# Patient Record
Sex: FEMALE | Race: OTHER | HISPANIC OR LATINO | Employment: FULL TIME | URBAN - METROPOLITAN AREA
[De-identification: names, ages, dates, MRNs, and addresses within clinical notes are randomized per-mention and may not be internally consistent; named-entity substitution may affect disease eponyms.]

---

## 2023-11-18 ENCOUNTER — OFFICE VISIT (OUTPATIENT)
Dept: PRIMARY CARE | Facility: CLINIC | Age: 38
End: 2023-11-18
Payer: COMMERCIAL

## 2023-11-18 VITALS
BODY MASS INDEX: 36.52 KG/M2 | WEIGHT: 186 LBS | HEIGHT: 60 IN | DIASTOLIC BLOOD PRESSURE: 62 MMHG | SYSTOLIC BLOOD PRESSURE: 112 MMHG

## 2023-11-18 DIAGNOSIS — Z00.00 HEALTH CARE MAINTENANCE: Primary | ICD-10-CM

## 2023-11-18 PROBLEM — R19.8 TENESMUS: Status: ACTIVE | Noted: 2017-10-30

## 2023-11-18 PROBLEM — M54.2 NECK PAIN: Status: ACTIVE | Noted: 2018-09-19

## 2023-11-18 PROBLEM — R22.32 SUBCUTANEOUS MASS OF FINGER OF LEFT HAND: Status: ACTIVE | Noted: 2023-11-18

## 2023-11-18 PROBLEM — M12.9 ARTHROPATHY: Status: ACTIVE | Noted: 2018-09-19

## 2023-11-18 PROBLEM — O34.10 UTERINE FIBROID DURING PREGNANCY, ANTEPARTUM (HHS-HCC): Status: ACTIVE | Noted: 2018-11-11

## 2023-11-18 PROBLEM — R11.0 NAUSEA: Status: ACTIVE | Noted: 2018-09-19

## 2023-11-18 PROBLEM — K64.4 EXTERNAL HEMORRHOIDS: Status: ACTIVE | Noted: 2017-10-30

## 2023-11-18 PROBLEM — R10.9 ABDOMINAL PAIN: Status: ACTIVE | Noted: 2018-09-19

## 2023-11-18 PROBLEM — K60.2 ANAL FISSURE: Status: ACTIVE | Noted: 2018-09-19

## 2023-11-18 PROBLEM — M25.511 RIGHT SHOULDER PAIN: Status: ACTIVE | Noted: 2023-11-18

## 2023-11-18 PROBLEM — S13.9XXA NECK SPRAIN: Status: ACTIVE | Noted: 2018-09-19

## 2023-11-18 PROBLEM — M25.842 CYST OF JOINT OF LEFT HAND: Status: ACTIVE | Noted: 2023-11-18

## 2023-11-18 PROBLEM — G43.909 MIGRAINE HEADACHE: Status: ACTIVE | Noted: 2023-11-18

## 2023-11-18 PROBLEM — S23.9XXA THORACIC BACK SPRAIN: Status: ACTIVE | Noted: 2018-09-19

## 2023-11-18 PROBLEM — K62.6 ULCER OF ANUS: Status: ACTIVE | Noted: 2018-09-19

## 2023-11-18 PROBLEM — M47.814 THORACIC SPONDYLOSIS WITHOUT MYELOPATHY: Status: ACTIVE | Noted: 2018-09-19
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
## 2023-11-18 PROBLEM — D25.9 UTERINE FIBROID DURING PREGNANCY, ANTEPARTUM (HHS-HCC): Status: ACTIVE | Noted: 2018-11-11

## 2023-11-18 PROCEDURE — 1036F TOBACCO NON-USER: CPT | Performed by: INTERNAL MEDICINE

## 2023-11-18 PROCEDURE — 99385 PREV VISIT NEW AGE 18-39: CPT | Performed by: INTERNAL MEDICINE

## 2023-11-18 ASSESSMENT — ENCOUNTER SYMPTOMS
LOSS OF SENSATION IN FEET: 0
OCCASIONAL FEELINGS OF UNSTEADINESS: 0
DEPRESSION: 0

## 2023-11-18 NOTE — PROGRESS NOTES
Subjective   Patient ID: Jodi Romero is a 38 y.o. female who presents for New Patient Visit.    HPI   To establish PCP  For physical    Past medical history: Tubal ligation and   Social history: Non-smoker, social drinker  Family history: Father with hypertension end-stage renal disease  Occupation: Works as vascular tech  Review of Systems    Objective   /62   Ht 1.524 m (5')   Wt 84.4 kg (186 lb)   BMI 36.33 kg/m²     Physical Exam  Vitals reviewed.   Constitutional:       Appearance: Normal appearance.   HENT:      Head: Normocephalic and atraumatic.      Right Ear: Tympanic membrane, ear canal and external ear normal.      Left Ear: Tympanic membrane, ear canal and external ear normal.      Nose: Nose normal.      Mouth/Throat:      Pharynx: Oropharynx is clear.   Eyes:      Extraocular Movements: Extraocular movements intact.      Conjunctiva/sclera: Conjunctivae normal.      Pupils: Pupils are equal, round, and reactive to light.   Cardiovascular:      Rate and Rhythm: Normal rate and regular rhythm.      Pulses: Normal pulses.      Heart sounds: Normal heart sounds.   Pulmonary:      Effort: Pulmonary effort is normal.      Breath sounds: Normal breath sounds.   Abdominal:      General: Abdomen is flat. Bowel sounds are normal.      Palpations: Abdomen is soft.   Musculoskeletal:      Cervical back: Normal range of motion and neck supple.   Skin:     General: Skin is warm and dry.   Neurological:      General: No focal deficit present.      Mental Status: She is alert and oriented to person, place, and time.   Psychiatric:         Mood and Affect: Mood normal.         Assessment/Plan   Problem List Items Addressed This Visit    None  Visit Diagnoses         Codes    Health care maintenance    -  Primary Z00.00    Relevant Orders    CBC (Completed)    Comprehensive Metabolic Panel (Completed)    Lipid Panel (Completed)    Thyroid Stimulating Hormone (Completed)    Vitamin B12 (Completed)     Vitamin D 25-Hydroxy,Total (for eval of Vitamin D levels) (Completed)          Physical normal  Routine blood work ordered  Discussed diet and exercise

## 2023-11-21 ENCOUNTER — LAB (OUTPATIENT)
Dept: LAB | Facility: LAB | Age: 38
End: 2023-11-21
Payer: COMMERCIAL

## 2023-11-21 DIAGNOSIS — Z00.00 HEALTH CARE MAINTENANCE: ICD-10-CM

## 2023-11-21 LAB
25(OH)D3 SERPL-MCNC: 15 NG/ML (ref 30–100)
ALBUMIN SERPL BCP-MCNC: 4.5 G/DL (ref 3.4–5)
ALP SERPL-CCNC: 64 U/L (ref 33–110)
ALT SERPL W P-5'-P-CCNC: 14 U/L (ref 7–45)
ANION GAP SERPL CALC-SCNC: 13 MMOL/L (ref 10–20)
AST SERPL W P-5'-P-CCNC: 13 U/L (ref 9–39)
BILIRUB SERPL-MCNC: 0.6 MG/DL (ref 0–1.2)
BUN SERPL-MCNC: 16 MG/DL (ref 6–23)
CALCIUM SERPL-MCNC: 9.7 MG/DL (ref 8.6–10.6)
CHLORIDE SERPL-SCNC: 103 MMOL/L (ref 98–107)
CHOLEST SERPL-MCNC: 209 MG/DL (ref 0–199)
CHOLESTEROL/HDL RATIO: 5
CO2 SERPL-SCNC: 29 MMOL/L (ref 21–32)
CREAT SERPL-MCNC: 0.68 MG/DL (ref 0.5–1.05)
ERYTHROCYTE [DISTWIDTH] IN BLOOD BY AUTOMATED COUNT: 12.1 % (ref 11.5–14.5)
GFR SERPL CREATININE-BSD FRML MDRD: >90 ML/MIN/1.73M*2
GLUCOSE SERPL-MCNC: 92 MG/DL (ref 74–99)
HCT VFR BLD AUTO: 44.8 % (ref 36–46)
HDLC SERPL-MCNC: 41.5 MG/DL
HGB BLD-MCNC: 14.3 G/DL (ref 12–16)
LDLC SERPL CALC-MCNC: 111 MG/DL
MCH RBC QN AUTO: 28.4 PG (ref 26–34)
MCHC RBC AUTO-ENTMCNC: 31.9 G/DL (ref 32–36)
MCV RBC AUTO: 89 FL (ref 80–100)
NON HDL CHOLESTEROL: 168 MG/DL (ref 0–149)
NRBC BLD-RTO: 0 /100 WBCS (ref 0–0)
PLATELET # BLD AUTO: 361 X10*3/UL (ref 150–450)
POTASSIUM SERPL-SCNC: 4.6 MMOL/L (ref 3.5–5.3)
PROT SERPL-MCNC: 7 G/DL (ref 6.4–8.2)
RBC # BLD AUTO: 5.03 X10*6/UL (ref 4–5.2)
SODIUM SERPL-SCNC: 140 MMOL/L (ref 136–145)
TRIGL SERPL-MCNC: 285 MG/DL (ref 0–149)
TSH SERPL-ACNC: 1.61 MIU/L (ref 0.44–3.98)
VIT B12 SERPL-MCNC: 336 PG/ML (ref 211–911)
VLDL: 57 MG/DL (ref 0–40)
WBC # BLD AUTO: 8.9 X10*3/UL (ref 4.4–11.3)

## 2023-11-21 PROCEDURE — 80053 COMPREHEN METABOLIC PANEL: CPT

## 2023-11-21 PROCEDURE — 36415 COLL VENOUS BLD VENIPUNCTURE: CPT

## 2023-11-21 PROCEDURE — 82607 VITAMIN B-12: CPT

## 2023-11-21 PROCEDURE — 82306 VITAMIN D 25 HYDROXY: CPT

## 2023-11-21 PROCEDURE — 85027 COMPLETE CBC AUTOMATED: CPT

## 2023-11-21 PROCEDURE — 84443 ASSAY THYROID STIM HORMONE: CPT

## 2023-11-21 PROCEDURE — 80061 LIPID PANEL: CPT

## 2024-04-02 ENCOUNTER — OFFICE VISIT (OUTPATIENT)
Dept: OBSTETRICS AND GYNECOLOGY | Facility: CLINIC | Age: 39
End: 2024-04-02
Payer: COMMERCIAL

## 2024-04-02 VITALS
HEIGHT: 60 IN | DIASTOLIC BLOOD PRESSURE: 76 MMHG | SYSTOLIC BLOOD PRESSURE: 120 MMHG | WEIGHT: 197 LBS | BODY MASS INDEX: 38.68 KG/M2

## 2024-04-02 DIAGNOSIS — Z01.419 WELL WOMAN EXAM: Primary | ICD-10-CM

## 2024-04-02 DIAGNOSIS — N94.19 DYSPAREUNIA DUE TO MEDICAL CONDITION IN FEMALE: ICD-10-CM

## 2024-04-02 DIAGNOSIS — N94.6 DYSMENORRHEA: ICD-10-CM

## 2024-04-02 DIAGNOSIS — R10.2 PAIN IN FEMALE PELVIS: ICD-10-CM

## 2024-04-02 PROCEDURE — 99395 PREV VISIT EST AGE 18-39: CPT | Performed by: OBSTETRICS & GYNECOLOGY

## 2024-04-02 NOTE — PROGRESS NOTES
Subjective   Patient ID: Jodi Romero is a 38 y.o. female who presents for Well woman visit and Pelvic Pain.  Review of last year's note,     Alin Liang MD  Physician  Specialty: Obstetrics and Gynecology     Progress Notes     Signed     Encounter Date: 2021    Signed           Patient Discussion/Summary     Established patient 36 years old. 2 C-sections. Tubal ligation. Comes in for annual exam. She is aware of a lesion on the right labia majora at the 10 o'clock position     No new worries or concerns. Breast and pelvic exams were normal Pap smear obtained     We do see a small area of erythema. She had some recent drainage. She has had these symptoms in the past where it appears to be spontaneously draining vulvar abscess     There is a small area of ulceration which could just be the leading point of drainage but we want to rule out HSV so an HSV culture was done     Impressions well woman exam 2 children. Bilateral salpingectomy at time of last , culture to rule out HSV           Presents today for annual exam but having worsening symptoms of pain and cramps premenstrually and then after sex having some bleeding and pain with sex    2 C-sections.  Tubal ligation.  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  No meds.  Non-smoker no prior surgeries other than to  tubal ligation.  Children are 3 and 4 years old.  Last Pap  was negative negative    Family history thinks her mother had fibroids    Exam today breast abdominal pelvic exams normal.  Speculum exam I did not see any abnormal lesions.  Talked with possible causes such as endometriosis, adenomyosis, fibroids.    Well woman exam.  Increasing pelvic pain and dyspareunia.  Obtain pelvic ultrasound.  Follow-up to review results and recommendations.  Next Pap would be     Pelvic Pain  The patient's primary symptoms include pelvic pain.       Review of Systems   Genitourinary:  Positive for  dyspareunia and pelvic pain.       Objective   Physical Exam  Constitutional:       Appearance: Normal appearance. She is obese.   Chest:   Breasts:     Right: Normal.      Left: Normal.   Genitourinary:     General: Normal vulva.      Vagina: Normal.      Cervix: Normal.      Uterus: Normal.       Adnexa: Right adnexa normal and left adnexa normal.   Neurological:      Mental Status: She is alert.         Assessment/Plan   Well woman exam.  Increasing premenstrual pelvic pain.  Dyspareunia.  Bleeding after sex.  Obtain pelvic ultrasound and follow-up to review results and recommendations .  Has tried OTC meds for her pain         Alin Liang MD 04/02/24 11:25 AM

## 2024-04-16 ENCOUNTER — HOSPITAL ENCOUNTER (OUTPATIENT)
Dept: RADIOLOGY | Facility: HOSPITAL | Age: 39
Discharge: HOME | End: 2024-04-16
Payer: COMMERCIAL

## 2024-04-16 DIAGNOSIS — N94.19 DYSPAREUNIA DUE TO MEDICAL CONDITION IN FEMALE: ICD-10-CM

## 2024-04-16 DIAGNOSIS — N94.6 DYSMENORRHEA: ICD-10-CM

## 2024-04-16 DIAGNOSIS — R10.2 PAIN IN FEMALE PELVIS: ICD-10-CM

## 2024-04-16 PROCEDURE — 76830 TRANSVAGINAL US NON-OB: CPT | Performed by: STUDENT IN AN ORGANIZED HEALTH CARE EDUCATION/TRAINING PROGRAM

## 2024-04-16 PROCEDURE — 76856 US EXAM PELVIC COMPLETE: CPT

## 2024-04-16 PROCEDURE — 76856 US EXAM PELVIC COMPLETE: CPT | Performed by: STUDENT IN AN ORGANIZED HEALTH CARE EDUCATION/TRAINING PROGRAM

## 2024-04-23 ENCOUNTER — PHARMACY VISIT (OUTPATIENT)
Dept: PHARMACY | Facility: CLINIC | Age: 39
End: 2024-04-23
Payer: COMMERCIAL

## 2024-04-23 ENCOUNTER — OFFICE VISIT (OUTPATIENT)
Dept: PRIMARY CARE | Facility: CLINIC | Age: 39
End: 2024-04-23
Payer: COMMERCIAL

## 2024-04-23 VITALS
HEIGHT: 60 IN | BODY MASS INDEX: 38.44 KG/M2 | RESPIRATION RATE: 18 BRPM | DIASTOLIC BLOOD PRESSURE: 62 MMHG | HEART RATE: 76 BPM | SYSTOLIC BLOOD PRESSURE: 102 MMHG | OXYGEN SATURATION: 97 % | WEIGHT: 195.8 LBS

## 2024-04-23 DIAGNOSIS — R79.89 LOW VITAMIN D LEVEL: Primary | ICD-10-CM

## 2024-04-23 PROBLEM — S23.9XXA THORACIC BACK SPRAIN: Status: RESOLVED | Noted: 2018-09-19 | Resolved: 2024-04-23

## 2024-04-23 PROBLEM — O03.9: Status: RESOLVED | Noted: 2024-04-23 | Resolved: 2024-04-23

## 2024-04-23 PROBLEM — R11.0 NAUSEA: Status: RESOLVED | Noted: 2018-09-19 | Resolved: 2024-04-23

## 2024-04-23 PROBLEM — B02.9 HERPES ZOSTER: Status: RESOLVED | Noted: 2024-04-23 | Resolved: 2024-04-23

## 2024-04-23 PROBLEM — I49.9 CARDIAC ARRHYTHMIA: Status: RESOLVED | Noted: 2024-04-23 | Resolved: 2024-04-23

## 2024-04-23 PROBLEM — K60.2 ANAL FISSURE: Status: RESOLVED | Noted: 2018-09-19 | Resolved: 2024-04-23

## 2024-04-23 PROBLEM — K62.6 ULCER OF ANUS: Status: RESOLVED | Noted: 2018-09-19 | Resolved: 2024-04-23

## 2024-04-23 PROBLEM — S13.9XXA NECK SPRAIN: Status: RESOLVED | Noted: 2018-09-19 | Resolved: 2024-04-23

## 2024-04-23 PROBLEM — M54.2 NECK PAIN: Status: RESOLVED | Noted: 2018-09-19 | Resolved: 2024-04-23

## 2024-04-23 PROBLEM — M25.511 RIGHT SHOULDER PAIN: Status: RESOLVED | Noted: 2023-11-18 | Resolved: 2024-04-23

## 2024-04-23 PROBLEM — R22.32 SUBCUTANEOUS MASS OF FINGER OF LEFT HAND: Status: RESOLVED | Noted: 2023-11-18 | Resolved: 2024-04-23

## 2024-04-23 PROBLEM — E78.2 MIXED HYPERLIPIDEMIA: Status: ACTIVE | Noted: 2024-04-23

## 2024-04-23 PROBLEM — O34.10 UTERINE FIBROID DURING PREGNANCY, ANTEPARTUM (HHS-HCC): Status: RESOLVED | Noted: 2018-11-11 | Resolved: 2024-04-23

## 2024-04-23 PROBLEM — N90.89 LESION OF VULVA: Status: RESOLVED | Noted: 2024-04-23 | Resolved: 2024-04-23

## 2024-04-23 PROBLEM — R07.89 ATYPICAL CHEST PAIN: Status: RESOLVED | Noted: 2024-04-23 | Resolved: 2024-04-23

## 2024-04-23 PROBLEM — R73.9 HYPERGLYCEMIA: Status: RESOLVED | Noted: 2024-04-23 | Resolved: 2024-04-23

## 2024-04-23 PROBLEM — D25.9 UTERINE FIBROID DURING PREGNANCY, ANTEPARTUM (HHS-HCC): Status: RESOLVED | Noted: 2018-11-11 | Resolved: 2024-04-23
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
## 2024-04-23 PROBLEM — N94.10 DYSPAREUNIA DUE TO NON-PSYCHOGENIC CAUSE IN FEMALE: Status: RESOLVED | Noted: 2024-04-23 | Resolved: 2024-04-23

## 2024-04-23 PROBLEM — R35.0 INCREASED FREQUENCY OF URINATION: Status: RESOLVED | Noted: 2024-04-23 | Resolved: 2024-04-23

## 2024-04-23 PROBLEM — R19.8 TENESMUS: Status: RESOLVED | Noted: 2017-10-30 | Resolved: 2024-04-23

## 2024-04-23 PROBLEM — N91.2 AMENORRHEA: Status: RESOLVED | Noted: 2024-04-23 | Resolved: 2024-04-23

## 2024-04-23 PROBLEM — J02.9 SORE THROAT: Status: RESOLVED | Noted: 2024-04-23 | Resolved: 2024-04-23

## 2024-04-23 PROBLEM — J03.90 ACUTE TONSILLITIS: Status: RESOLVED | Noted: 2024-04-23 | Resolved: 2024-04-23

## 2024-04-23 PROBLEM — R10.9 ABDOMINAL PAIN: Status: RESOLVED | Noted: 2018-09-19 | Resolved: 2024-04-23

## 2024-04-23 PROCEDURE — RXMED WILLOW AMBULATORY MEDICATION CHARGE

## 2024-04-23 PROCEDURE — 1036F TOBACCO NON-USER: CPT | Performed by: NURSE PRACTITIONER

## 2024-04-23 PROCEDURE — 3008F BODY MASS INDEX DOCD: CPT | Performed by: NURSE PRACTITIONER

## 2024-04-23 PROCEDURE — 99214 OFFICE O/P EST MOD 30 MIN: CPT | Performed by: NURSE PRACTITIONER

## 2024-04-23 RX ORDER — ERGOCALCIFEROL 1.25 MG/1
50000 CAPSULE ORAL
Qty: 12 CAPSULE | Refills: 0 | Status: SHIPPED | OUTPATIENT
Start: 2024-04-28 | End: 2024-07-21

## 2024-04-23 NOTE — ASSESSMENT & PLAN NOTE
- eat off the smaller plate (like the salad plate)  - half the plate should be vegetables, 1/4 protein, 1/4 carbs - for lunch and dinner  -  discussed dietary changes including proper protein intake, increase vegetable intake, fruit intake, low carbohydrate intake, and no processed food intake.  - discussed meal prepping    put your fork down between bites  - drink at least 64 oz of water day.  do not consume large amounts of water with your meal  - do not drink sugary drinks such as pop or specialty coffee drinks  -  eat your vegetables and protein first.  Have vegetables at lunch and dinner  - discussed with patient to increase activity 4 days a week preferably 5. Exercise should be done 5 days a week including walking for 20-30 minutes each day.  Start slowly if you have not been exercising - start with 15 minutes 3-4 times a week.   -  keep log of calorie intake and food intake and bring with you to next appointment.You can use an marley on your phone such as my fitness pal.  - discussed with patient using activity trackers such as a fit bit. log  activity daily and bring  activity log at next visit  - increase your protein intake - should have at least 6-7 servings of protein per day  - decrease carb intake - discussed carb serving size and to read packages  - limit carb servings to 4 servings a day  -1500-calorie meal plan get patient and reviewed

## 2024-04-23 NOTE — ASSESSMENT & PLAN NOTE
Reviewed lab work from 2023 and the previous 3 years.  Total cholesterol has improved.  LDL has improved.  HDL still remains on the low side  Triglycerides are elevated but have improved  We discussed dietary changes to try to help improve her elevated triglycerides  Also discussed at length try weight loss first to see if that does help with her numbers

## 2024-04-23 NOTE — PROGRESS NOTES
Subjective   Patient ID: Jodi Romero is a 39 y.o. female who presents for Follow-up (Jodi is in today for her routine F/U and to discuss recent blood work. ).    Patient presents to reestablish care labs that were completed in November.  She has had a history of elevated triglycerides and hyperlipidemia.  She would like to review her lipid panel and discussed ways to help lower that.  She would also like to discuss ways to help with weight loss  She works as a vascular tech  She has 2 children  She does not smoke         Review of Systems   All other systems reviewed and are negative.      Objective   /62   Pulse 76   Resp 18   Ht 1.524 m (5')   Wt 88.8 kg (195 lb 12.8 oz)   LMP 03/12/2024   SpO2 97%   BMI 38.24 kg/m²     Physical Exam  Vitals and nursing note reviewed.   Constitutional:       General: She is not in acute distress.     Appearance: Normal appearance. She is normal weight.   HENT:      Head: Normocephalic and atraumatic.      Right Ear: External ear normal.      Left Ear: External ear normal.      Nose: Nose normal.      Mouth/Throat:      Mouth: Mucous membranes are moist.      Pharynx: Oropharynx is clear.   Eyes:      Extraocular Movements: Extraocular movements intact.      Conjunctiva/sclera: Conjunctivae normal.      Pupils: Pupils are equal, round, and reactive to light.   Neck:      Vascular: No carotid bruit.   Cardiovascular:      Rate and Rhythm: Normal rate and regular rhythm.      Pulses: Normal pulses.      Heart sounds: Normal heart sounds.   Pulmonary:      Effort: Pulmonary effort is normal.      Breath sounds: Normal breath sounds.   Musculoskeletal:      Cervical back: Normal range of motion and neck supple.   Lymphadenopathy:      Cervical: No cervical adenopathy.   Skin:     General: Skin is warm and dry.      Capillary Refill: Capillary refill takes less than 2 seconds.   Neurological:      Mental Status: She is alert and oriented to person, place, and time.    Psychiatric:         Mood and Affect: Mood normal.         Behavior: Behavior normal.         Thought Content: Thought content normal.         Judgment: Judgment normal.         Assessment/Plan   Problem List Items Addressed This Visit             ICD-10-CM    BMI 38.0-38.9,adult Z68.38     - eat off the smaller plate (like the salad plate)  - half the plate should be vegetables, 1/4 protein, 1/4 carbs - for lunch and dinner  -  discussed dietary changes including proper protein intake, increase vegetable intake, fruit intake, low carbohydrate intake, and no processed food intake.  - discussed meal prepping    put your fork down between bites  - drink at least 64 oz of water day.  do not consume large amounts of water with your meal  - do not drink sugary drinks such as pop or specialty coffee drinks  -  eat your vegetables and protein first.  Have vegetables at lunch and dinner  - discussed with patient to increase activity 4 days a week preferably 5. Exercise should be done 5 days a week including walking for 20-30 minutes each day.  Start slowly if you have not been exercising - start with 15 minutes 3-4 times a week.   -  keep log of calorie intake and food intake and bring with you to next appointment.You can use an marley on your phone such as [employer] pal.  - discussed with patient using activity trackers such as a fit bit. log  activity daily and bring  activity log at next visit  - increase your protein intake - should have at least 6-7 servings of protein per day  - decrease carb intake - discussed carb serving size and to read packages  - limit carb servings to 4 servings a day  -1500-calorie meal plan get patient and reviewed         Low vitamin D level - Primary R79.89     Reviewed labs with patient  Vitamin D 50,000 units once weekly x 12 weeks         Relevant Medications    ergocalciferol (Vitamin D-2) 1.25 MG (17977 UT) capsule (Start on 4/28/2024)

## 2024-04-23 NOTE — PATIENT INSTRUCTIONS
Your vitamin b level is on the low side of normal.  I want you to take a vitamin b complex over the counter - one tablet daily  Your vitamin d level is very low.  I sent in once weekly vitamin d to take for 12 weeks.  After you complete this I want you to take vitamin d3 2000 international units  daily   - eat off the smaller plate (like the salad plate)  - half the plate should be vegetables, 1/4 protein, 1/4 carbs - for lunch and dinner  -  discussed dietary changes including proper protein intake, increase vegetable intake, fruit intake, low carbohydrate intake, and no processed food intake.  - discussed meal prepping    put your fork down between bites  - drink at least 64 oz of water day.  do not consume large amounts of water with your meal  - do not drink sugary drinks such as pop or specialty coffee drinks  -  eat your vegetables and protein first.  Have vegetables at lunch and dinner  - discussed with patient to increase activity 4 days a week preferably 5. Exercise should be done 5 days a week including walking for 20-30 minutes each day.  Start slowly if you have not been exercising - start with 15 minutes 3-4 times a week.   -  keep log of calorie intake and food intake and bring with you to next appointment.You can use an marley on your phone such as my fitness pal.  - discussed with patient using activity trackers such as a fit bit. log  activity daily and bring  activity log at next visit  - increase your protein intake - should have at least 5-6 servings of protein per day  - decrease carb intake - discussed carb serving size and to read packages  - limit carb servings to 4 servings a day  -1500-calorie meal plan get patient and reviewed

## 2024-05-07 ENCOUNTER — PREP FOR PROCEDURE (OUTPATIENT)
Dept: OBSTETRICS AND GYNECOLOGY | Facility: HOSPITAL | Age: 39
End: 2024-05-07

## 2024-05-07 ENCOUNTER — OFFICE VISIT (OUTPATIENT)
Dept: OBSTETRICS AND GYNECOLOGY | Facility: CLINIC | Age: 39
End: 2024-05-07
Payer: COMMERCIAL

## 2024-05-07 VITALS
WEIGHT: 194 LBS | SYSTOLIC BLOOD PRESSURE: 126 MMHG | BODY MASS INDEX: 38.09 KG/M2 | DIASTOLIC BLOOD PRESSURE: 68 MMHG | HEIGHT: 60 IN

## 2024-05-07 DIAGNOSIS — N94.10 DYSPAREUNIA, FEMALE: ICD-10-CM

## 2024-05-07 DIAGNOSIS — R10.2 PAIN IN FEMALE PELVIS: Primary | ICD-10-CM

## 2024-05-07 DIAGNOSIS — N94.6 DYSMENORRHEA: ICD-10-CM

## 2024-05-07 PROCEDURE — 99214 OFFICE O/P EST MOD 30 MIN: CPT | Performed by: OBSTETRICS & GYNECOLOGY

## 2024-05-07 PROCEDURE — 3008F BODY MASS INDEX DOCD: CPT | Performed by: OBSTETRICS & GYNECOLOGY

## 2024-05-07 RX ORDER — ACETAMINOPHEN 325 MG/1
975 TABLET ORAL ONCE
Status: CANCELLED | OUTPATIENT
Start: 2024-05-07 | End: 2024-05-07

## 2024-05-07 RX ORDER — GABAPENTIN 600 MG/1
600 TABLET ORAL ONCE
Status: CANCELLED | OUTPATIENT
Start: 2024-05-07 | End: 2024-05-07

## 2024-05-07 RX ORDER — CELECOXIB 50 MG/1
400 CAPSULE ORAL ONCE
Status: CANCELLED | OUTPATIENT
Start: 2024-05-07 | End: 2024-05-07

## 2024-05-07 NOTE — PROGRESS NOTES
Subjective   Patient ID: Jodi Romero is a 39 y.o. female who presents for Follow-up.  Review of my last note,     Alin Liang MD  Physician  Obstetrics     Progress Notes     Signed     Encounter Date: 2024    Signed     Expand All Collapse All       Subjective   Patient ID: Jodi Romero is a 38 y.o. female who presents for Well woman visit and Pelvic Pain.  Review of last year's note,     Alin Liang MD  Physician  Specialty: Obstetrics and Gynecology     Progress Notes     Signed     Encounter Date: 2021     Signed             Patient Discussion/Summary     Established patient 36 years old. 2 C-sections. Tubal ligation. Comes in for annual exam. She is aware of a lesion on the right labia majora at the 10 o'clock position     No new worries or concerns. Breast and pelvic exams were normal Pap smear obtained     We do see a small area of erythema. She had some recent drainage. She has had these symptoms in the past where it appears to be spontaneously draining vulvar abscess     There is a small area of ulceration which could just be the leading point of drainage but we want to rule out HSV so an HSV culture was done     Impressions well woman exam 2 children. Bilateral salpingectomy at time of last , culture to rule out HSV               Presents today for annual exam but having worsening symptoms of pain and cramps premenstrually and then after sex having some bleeding and pain with sex     2 C-sections.  Tubal ligation.  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  No meds.  Non-smoker no prior surgeries other than to  tubal ligation.  Children are 3 and 4 years old.  Last Pap  was negative negative     Family history thinks her mother had fibroids     Exam today breast abdominal pelvic exams normal.  Speculum exam I did not see any abnormal lesions.  Talked with possible causes such as endometriosis, adenomyosis, fibroids.     Well woman exam.   Increasing pelvic pain and dyspareunia.  Obtain pelvic ultrasound.  Follow-up to review results and recommendations.  Next Pap would be 2026     Pelvic Pain  The patient's primary symptoms include pelvic pain.         Review of Systems   Genitourinary:  Positive for dyspareunia and pelvic pain.               Objective   Physical Exam  Constitutional:       Appearance: Normal appearance. She is obese.   Chest:   Breasts:     Right: Normal.      Left: Normal.   Genitourinary:     General: Normal vulva.      Vagina: Normal.      Cervix: Normal.      Uterus: Normal.       Adnexa: Right adnexa normal and left adnexa normal.   Neurological:      Mental Status: She is alert.                  Assessment/Plan   Well woman exam.  Increasing premenstrual pelvic pain.  Dyspareunia.  Bleeding after sex.  Obtain pelvic ultrasound and follow-up to review results and recommendations .  Has tried OTC meds for her pain        Alin Liang MD 04/02/24 11:25 AM      Review of pelvic ultrasound,  US PELVIS TRANSABDOMINAL WITH TRANSVAGINAL  Status: Final result    Study Result    Narrative & Impression  Interpreted By:  Juanjose Lindo,   STUDY:  US PELVIS TRANSABDOMINAL WITH TRANSVAGINAL;  4/16/2024 11:16 am      INDICATION:  Signs/Symptoms:Dysmenorrhea dyspareunia, pain in female pelvis.      COMPARISON:  01/28/2011      ACCESSION NUMBER(S):  [id number]      ORDERING CLINICIAN:  ALIN LIANG      TECHNIQUE:  Multiple multiplanar static gray scale, color and spectral waveform  sonographic images of the pelvis were obtained. Transabdominal and  endovaginal ultrasound was performed.      FINDINGS:  UTERUS:  The uterus measures  4.2 cm  x 3.1 cm x 8.1 cm. The uterine  myometrium appears normal.      ENDOMETRIUM:  The endometrium measures a thickness of 0.3 cm, which is normal.      RIGHT ADNEXA:  Not visualized due to overlying bowel gas.      LEFT ADNEXA:  The left ovary measures 2.0 cm x 1.1 cm  x 3.1 cm and demonstrates  normal flow.  Physiologic follicles are noted. No gross left adnexal  masses are seen, no hydrosalpinx.      CUL DE SAC:  No gross free fluid is seen in the pelvic cul-de-sac.      IMPRESSION:  1. Visualized right adnexa due to overlying bowel gas. Otherwise,  unremarkable pelvic ultrasound.      MACRO:  None      Signed by: Juanjose Lindo 4/17/2024 4:05 PM  Dictation workstation:   [id number]    Review options after normal ultrasound.  We talked about observation versus GnRH antagonist medications that lower estrogen levels versus trying to figure out what is going on with outpatient laparoscopy.  Few years of pain more right-sided.  No medical allergies.  She wants to proceed with laparoscopy.  Reviewed surgery risk benefits complications recovery.  She did not react well to lidocaine in the past.  Avoid lidocaine during laparoscopy            Review of Systems   Genitourinary:  Positive for dyspareunia and pelvic pain.       Objective   Physical Exam  Constitutional:       Appearance: Normal appearance. She is obese.   Neurological:      Mental Status: She is alert.         Assessment/Plan   Established patient 39 years old.  2 prior C-sections.  Tubal ligation.  Few years of pain.  Normal ultrasound.  Minimal relief with over-the-counter meds.  Looking for answers.  Plan is laparoscopy.  Reviewed surgery risk benefits complications recovery.  Avoid lidocaine         Alin Liang MD 05/07/24 11:40 AM

## 2024-06-04 ENCOUNTER — ANESTHESIA EVENT (OUTPATIENT)
Dept: OPERATING ROOM | Facility: HOSPITAL | Age: 39
End: 2024-06-04
Payer: COMMERCIAL

## 2024-06-05 RX ORDER — DROPERIDOL 2.5 MG/ML
0.62 INJECTION, SOLUTION INTRAMUSCULAR; INTRAVENOUS ONCE AS NEEDED
Status: CANCELLED | OUTPATIENT
Start: 2024-06-05

## 2024-06-05 RX ORDER — SODIUM CHLORIDE, SODIUM LACTATE, POTASSIUM CHLORIDE, CALCIUM CHLORIDE 600; 310; 30; 20 MG/100ML; MG/100ML; MG/100ML; MG/100ML
100 INJECTION, SOLUTION INTRAVENOUS CONTINUOUS
Status: CANCELLED | OUTPATIENT
Start: 2024-06-05

## 2024-06-06 ENCOUNTER — HOSPITAL ENCOUNTER (OUTPATIENT)
Facility: HOSPITAL | Age: 39
Setting detail: OUTPATIENT SURGERY
Discharge: HOME | End: 2024-06-06
Attending: OBSTETRICS & GYNECOLOGY | Admitting: OBSTETRICS & GYNECOLOGY
Payer: COMMERCIAL

## 2024-06-06 ENCOUNTER — ANESTHESIA (OUTPATIENT)
Dept: OPERATING ROOM | Facility: HOSPITAL | Age: 39
End: 2024-06-06
Payer: COMMERCIAL

## 2024-06-06 VITALS
SYSTOLIC BLOOD PRESSURE: 118 MMHG | DIASTOLIC BLOOD PRESSURE: 70 MMHG | TEMPERATURE: 97.9 F | RESPIRATION RATE: 18 BRPM | OXYGEN SATURATION: 98 % | WEIGHT: 193.12 LBS | HEART RATE: 66 BPM | HEIGHT: 60 IN | BODY MASS INDEX: 37.92 KG/M2

## 2024-06-06 DIAGNOSIS — N94.6 DYSMENORRHEA: ICD-10-CM

## 2024-06-06 DIAGNOSIS — R10.2 PAIN IN FEMALE PELVIS: Primary | ICD-10-CM

## 2024-06-06 DIAGNOSIS — N94.10 DYSPAREUNIA, FEMALE: ICD-10-CM

## 2024-06-06 LAB — PREGNANCY TEST URINE, POC: NEGATIVE

## 2024-06-06 PROCEDURE — 2500000004 HC RX 250 GENERAL PHARMACY W/ HCPCS (ALT 636 FOR OP/ED): Performed by: OBSTETRICS & GYNECOLOGY

## 2024-06-06 PROCEDURE — 7100000001 HC RECOVERY ROOM TIME - INITIAL BASE CHARGE: Performed by: OBSTETRICS & GYNECOLOGY

## 2024-06-06 PROCEDURE — 2720000007 HC OR 272 NO HCPCS: Performed by: OBSTETRICS & GYNECOLOGY

## 2024-06-06 PROCEDURE — 2500000004 HC RX 250 GENERAL PHARMACY W/ HCPCS (ALT 636 FOR OP/ED): Performed by: NURSE ANESTHETIST, CERTIFIED REGISTERED

## 2024-06-06 PROCEDURE — 2500000001 HC RX 250 WO HCPCS SELF ADMINISTERED DRUGS (ALT 637 FOR MEDICARE OP): Performed by: OBSTETRICS & GYNECOLOGY

## 2024-06-06 PROCEDURE — 3600000008 HC OR TIME - EACH INCREMENTAL 1 MINUTE - PROCEDURE LEVEL THREE: Performed by: OBSTETRICS & GYNECOLOGY

## 2024-06-06 PROCEDURE — 7100000002 HC RECOVERY ROOM TIME - EACH INCREMENTAL 1 MINUTE: Performed by: OBSTETRICS & GYNECOLOGY

## 2024-06-06 PROCEDURE — 49320 DIAG LAPARO SEPARATE PROC: CPT | Performed by: OBSTETRICS & GYNECOLOGY

## 2024-06-06 PROCEDURE — 2500000004 HC RX 250 GENERAL PHARMACY W/ HCPCS (ALT 636 FOR OP/ED): Performed by: ANESTHESIOLOGY

## 2024-06-06 PROCEDURE — 7100000009 HC PHASE TWO TIME - INITIAL BASE CHARGE: Performed by: OBSTETRICS & GYNECOLOGY

## 2024-06-06 PROCEDURE — A4217 STERILE WATER/SALINE, 500 ML: HCPCS | Performed by: OBSTETRICS & GYNECOLOGY

## 2024-06-06 PROCEDURE — 81025 URINE PREGNANCY TEST: CPT | Performed by: ANESTHESIOLOGY

## 2024-06-06 PROCEDURE — 3600000003 HC OR TIME - INITIAL BASE CHARGE - PROCEDURE LEVEL THREE: Performed by: OBSTETRICS & GYNECOLOGY

## 2024-06-06 PROCEDURE — 3700000002 HC GENERAL ANESTHESIA TIME - EACH INCREMENTAL 1 MINUTE: Performed by: OBSTETRICS & GYNECOLOGY

## 2024-06-06 PROCEDURE — 7100000010 HC PHASE TWO TIME - EACH INCREMENTAL 1 MINUTE: Performed by: OBSTETRICS & GYNECOLOGY

## 2024-06-06 PROCEDURE — 2500000005 HC RX 250 GENERAL PHARMACY W/O HCPCS: Performed by: NURSE ANESTHETIST, CERTIFIED REGISTERED

## 2024-06-06 PROCEDURE — 3700000001 HC GENERAL ANESTHESIA TIME - INITIAL BASE CHARGE: Performed by: OBSTETRICS & GYNECOLOGY

## 2024-06-06 PROCEDURE — 2500000005 HC RX 250 GENERAL PHARMACY W/O HCPCS: Performed by: OBSTETRICS & GYNECOLOGY

## 2024-06-06 RX ORDER — KETOROLAC TROMETHAMINE 30 MG/ML
INJECTION, SOLUTION INTRAMUSCULAR; INTRAVENOUS AS NEEDED
Status: DISCONTINUED | OUTPATIENT
Start: 2024-06-06 | End: 2024-06-06

## 2024-06-06 RX ORDER — MIDAZOLAM HYDROCHLORIDE 1 MG/ML
INJECTION INTRAMUSCULAR; INTRAVENOUS AS NEEDED
Status: DISCONTINUED | OUTPATIENT
Start: 2024-06-06 | End: 2024-06-06

## 2024-06-06 RX ORDER — PROPOFOL 10 MG/ML
INJECTION, EMULSION INTRAVENOUS AS NEEDED
Status: DISCONTINUED | OUTPATIENT
Start: 2024-06-06 | End: 2024-06-06

## 2024-06-06 RX ORDER — HYDROMORPHONE HYDROCHLORIDE 2 MG/ML
INJECTION, SOLUTION INTRAMUSCULAR; INTRAVENOUS; SUBCUTANEOUS CONTINUOUS PRN
Status: DISCONTINUED | OUTPATIENT
Start: 2024-06-06 | End: 2024-06-06

## 2024-06-06 RX ORDER — CELECOXIB 400 MG/1
400 CAPSULE ORAL ONCE
Status: COMPLETED | OUTPATIENT
Start: 2024-06-06 | End: 2024-06-06

## 2024-06-06 RX ORDER — GABAPENTIN 300 MG/1
600 CAPSULE ORAL ONCE
Status: COMPLETED | OUTPATIENT
Start: 2024-06-06 | End: 2024-06-06

## 2024-06-06 RX ORDER — SODIUM CHLORIDE 0.9 G/100ML
IRRIGANT IRRIGATION AS NEEDED
Status: DISCONTINUED | OUTPATIENT
Start: 2024-06-06 | End: 2024-06-06 | Stop reason: HOSPADM

## 2024-06-06 RX ORDER — POVIDONE-IODINE 10 %
SOLUTION, NON-ORAL TOPICAL AS NEEDED
Status: DISCONTINUED | OUTPATIENT
Start: 2024-06-06 | End: 2024-06-06 | Stop reason: HOSPADM

## 2024-06-06 RX ORDER — ACETAMINOPHEN 325 MG/1
975 TABLET ORAL ONCE
Status: COMPLETED | OUTPATIENT
Start: 2024-06-06 | End: 2024-06-06

## 2024-06-06 RX ORDER — ROCURONIUM BROMIDE 10 MG/ML
INJECTION, SOLUTION INTRAVENOUS AS NEEDED
Status: DISCONTINUED | OUTPATIENT
Start: 2024-06-06 | End: 2024-06-06

## 2024-06-06 RX ORDER — SODIUM CHLORIDE, SODIUM LACTATE, POTASSIUM CHLORIDE, CALCIUM CHLORIDE 600; 310; 30; 20 MG/100ML; MG/100ML; MG/100ML; MG/100ML
100 INJECTION, SOLUTION INTRAVENOUS CONTINUOUS
Status: DISCONTINUED | OUTPATIENT
Start: 2024-06-06 | End: 2024-06-06 | Stop reason: HOSPADM

## 2024-06-06 RX ORDER — ONDANSETRON HYDROCHLORIDE 2 MG/ML
4 INJECTION, SOLUTION INTRAVENOUS ONCE AS NEEDED
Status: COMPLETED | OUTPATIENT
Start: 2024-06-06 | End: 2024-06-06

## 2024-06-06 RX ORDER — OXYCODONE HYDROCHLORIDE 5 MG/1
5 TABLET ORAL EVERY 4 HOURS PRN
Status: DISCONTINUED | OUTPATIENT
Start: 2024-06-06 | End: 2024-06-06 | Stop reason: HOSPADM

## 2024-06-06 RX ORDER — ONDANSETRON HYDROCHLORIDE 2 MG/ML
INJECTION, SOLUTION INTRAVENOUS AS NEEDED
Status: DISCONTINUED | OUTPATIENT
Start: 2024-06-06 | End: 2024-06-06

## 2024-06-06 RX ORDER — POVIDONE-IODINE 7.5 MG/ML
SOLUTION TOPICAL AS NEEDED
Status: DISCONTINUED | OUTPATIENT
Start: 2024-06-06 | End: 2024-06-06 | Stop reason: HOSPADM

## 2024-06-06 RX ORDER — FENTANYL CITRATE 50 UG/ML
INJECTION, SOLUTION INTRAMUSCULAR; INTRAVENOUS AS NEEDED
Status: DISCONTINUED | OUTPATIENT
Start: 2024-06-06 | End: 2024-06-06

## 2024-06-06 RX ADMIN — PROPOFOL 40 MG: 10 INJECTION, EMULSION INTRAVENOUS at 12:01

## 2024-06-06 RX ADMIN — ACETAMINOPHEN 975 MG: 325 TABLET ORAL at 10:36

## 2024-06-06 RX ADMIN — SODIUM CHLORIDE, POTASSIUM CHLORIDE, SODIUM LACTATE AND CALCIUM CHLORIDE 100 ML/HR: 600; 310; 30; 20 INJECTION, SOLUTION INTRAVENOUS at 10:40

## 2024-06-06 RX ADMIN — ONDANSETRON 4 MG: 2 INJECTION, SOLUTION INTRAMUSCULAR; INTRAVENOUS at 12:12

## 2024-06-06 RX ADMIN — CELECOXIB 400 MG: 400 CAPSULE ORAL at 10:37

## 2024-06-06 RX ADMIN — PROPOFOL 160 MG: 10 INJECTION, EMULSION INTRAVENOUS at 11:48

## 2024-06-06 RX ADMIN — SODIUM CHLORIDE, POTASSIUM CHLORIDE, SODIUM LACTATE AND CALCIUM CHLORIDE 100 ML/HR: 600; 310; 30; 20 INJECTION, SOLUTION INTRAVENOUS at 10:39

## 2024-06-06 RX ADMIN — DEXAMETHASONE SODIUM PHOSPHATE 4 MG: 4 INJECTION INTRA-ARTICULAR; INTRALESIONAL; INTRAMUSCULAR; INTRAVENOUS; SOFT TISSUE at 11:56

## 2024-06-06 RX ADMIN — SUGAMMADEX 600 MG: 100 INJECTION, SOLUTION INTRAVENOUS at 12:19

## 2024-06-06 RX ADMIN — FENTANYL CITRATE 50 MCG: 50 INJECTION, SOLUTION INTRAMUSCULAR; INTRAVENOUS at 11:48

## 2024-06-06 RX ADMIN — FENTANYL CITRATE 50 MCG: 50 INJECTION, SOLUTION INTRAMUSCULAR; INTRAVENOUS at 12:10

## 2024-06-06 RX ADMIN — ROCURONIUM BROMIDE 50 MG: 10 INJECTION, SOLUTION INTRAVENOUS at 11:49

## 2024-06-06 RX ADMIN — MIDAZOLAM HYDROCHLORIDE 2 MG: 1 INJECTION, SOLUTION INTRAMUSCULAR; INTRAVENOUS at 11:45

## 2024-06-06 RX ADMIN — GABAPENTIN 300 MG: 300 CAPSULE ORAL at 10:38

## 2024-06-06 RX ADMIN — ONDANSETRON 4 MG: 2 INJECTION INTRAMUSCULAR; INTRAVENOUS at 13:27

## 2024-06-06 RX ADMIN — KETOROLAC TROMETHAMINE 30 MG: 30 INJECTION, SOLUTION INTRAMUSCULAR; INTRAVENOUS at 11:58

## 2024-06-06 RX ADMIN — ROCURONIUM BROMIDE 20 MG: 10 INJECTION, SOLUTION INTRAVENOUS at 12:07

## 2024-06-06 SDOH — HEALTH STABILITY: MENTAL HEALTH: CURRENT SMOKER: 0

## 2024-06-06 ASSESSMENT — PAIN SCALES - GENERAL
PAINLEVEL_OUTOF10: 0 - NO PAIN
PAIN_LEVEL: 0
PAINLEVEL_OUTOF10: 0 - NO PAIN

## 2024-06-06 ASSESSMENT — PAIN - FUNCTIONAL ASSESSMENT: PAIN_FUNCTIONAL_ASSESSMENT: 0-10

## 2024-06-06 NOTE — OP NOTE
Date: 2024  OR Location: [facility] OR    Name: Jodi Romero, : 1985, Age: 39 y.o., MRN: [id number], Sex: female    Diagnosis  Pre-op Diagnosis     * Pain in female pelvis [R10.2]     * Dysmenorrhea [N94.6]     * Dyspareunia, female [N94.10] Post-op Diagnosis     * Pain in female pelvis [R10.2]     * Dysmenorrhea [N94.6]     * Dyspareunia, female [N94.10]     Procedures  EXPLORATION LAPAROSCOPY  78752 - IL LAPS ABD PRTM&OMENTUM DX W/WO SPEC BR/WA SPX    Under general anesthetic dorsal mid position patient prepped draped usual manner.  In-N-Out catheterization done in the bladder.  Sponge stick placed in the vagina.  Had her stomach empty.    In the left upper quadrant 2 fingerbreadths below the costal margin midclavicular line incision made Veress needle inserted peritoneal cavity insufflated.  5 mm trocar inserted laparoscope used to visualize the pelvis.  No significant abdominal adhesions.  5 mm suprapubic port placed.    Was a bulky uterus evidence of tubal ligation normal ovaries no evidence of endometriosis or significant adhesions rest the abdomen pelvis normal.  Main finding was a bulky uterus possibly consistent with adenomyosis    Instruments removed.  Peritoneal cavity allowed to exsufflated.  Skin incisions reapproximated.  Transferred to recovery room in stable condition.  Minimal blood loss and correct counts  Surgeons      * Alin Liang - Primary    Resident/Fellow/Other Assistant:  Surgeons and Role:  * No surgeons found with a matching role *    Procedure Summary  Anesthesia: Consult  ASA: II  Anesthesia Staff: Anesthesiologist: Gabe Berumen MD  CRNA: ISIAH Carlos-CRNA  Estimated Blood Loss: 5mL  Intra-op Medications:   Administrations occurring from 1130 to 1230 on 24:   Medication Name Total Dose   povidone-iodine (Betadine) 7.5 % soap 1 Application   povidone-iodine (Betadine) 10 % topical solution 1 Application   sodium chloride 0.9 % irrigation solution 1,000 mL   lactated  Ringer's infusion Cannot be calculated              Anesthesia Record               Intraprocedure I/O Totals       None           Specimen: No specimens collected     Staff:   Circulator: Anders Bear Scrub: Rosa  Circulator: Kaitlin Hawley Person: Verenice         Procedure Details:  The patient was seen in the preoperative area. The site of surgery was properly noted/marked if necessary per policy. The patient has been actively warmed in preoperative area. Preoperative antibiotics are not indicated. Venous thrombosis prophylaxis have been ordered including bilateral sequential compression devices    Findings: Bulky symmetric enlarged uterus consistent with possible adenomyosis    Complications:  None; patient tolerated the procedure well.     Disposition: PACU - hemodynamically stable.  Condition: stable
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
## 2024-06-06 NOTE — DISCHARGE INSTRUCTIONS
"Minimally invasive surgery    The Basics  Written by the doctors and editors at [facility]  What is minimally invasive surgery? -- Minimally invasive surgery is a way to do surgery with less damage than in \"open\" surgery.  In open surgery, doctors make 1 large cut, or \"incision,\" in the skin. This lets them see directly into the body. During minimally invasive surgery, instead of making 1 large incision, doctors make 1 or a few smaller incisions. In some cases, they do not need to make any incisions at all. Then, they use special tools to see and work inside the body.  Minimally invasive surgery does less damage to the body than open surgery. In many cases, this makes it easier and faster for people to recover.  This article is about minimally invasive surgery that is done with a \"scope.\" A scope is a long, flexible tube with a tiny camera and a light on the end. It allows the doctor to see inside the body. The camera sends pictures to a screen that the doctor can see. There are other types of minimally invasive surgery that do not involve a scope.  What are the different types of minimally invasive surgery? -- There are many types. The names of specific surgeries are based on the [place]ian words for the body parts that are involved. The scopes used for the different types of surgery are named that way, too. Types of minimally invasive surgery include:  ? Thoracoscopic - This can be used to remove pieces of lung or do certain types of heart surgery. The scope used for this is called a \"thoracoscope.\" (\"Thorax\" means chest.)  ? Laparoscopic - This can be used to remove the gallbladder, appendix, or uterus, or do lots of other different procedures in and around the belly. The scope used for this is called a \"laparoscope.\" (\"Lapara\" means the space between the bottom of the rib cage and the hips.)  ? Hysteroscopic - This can be used to remove abnormal growths in the uterus, or do other procedures on the uterus and vagina. " "The scope used for this is called a \"hysteroscope.\" (\"Hystera\" means uterus.) Because the scope goes in through the vagina, no incisions are needed for this type of surgery.  ? Cystoscopic - This can be used to check or remove abnormal growths in the bladder, or to remove an enlarged prostate gland. The scope used for this is called a \"cystoscope.\" (\"Kystis\" means bladder.) Because the scope is inserted through the urethra, no incisions are needed for this type of surgery.  ? Arthroscopic - This can be used to repair or rebuild joints in the knee, shoulder, or hip. The scope used for this is called an \"arthroscope.\" (\"Arthron\" means joint.)  Other procedures, like colonoscopy, also involve a scope. But these are not usually considered surgery.  How is minimally invasive surgery done? -- It depends on the type of surgery. For many types of minimally invasive surgery, the doctor will:  ? Make a small incision in the skin  ? Push the scope through this first incision so they can see inside the body  ? Make other small incisions on the body  ? Pass special long, thin tools through these incisions, then use these tools to work inside the body  ? Open up the space inside the body using either gas or fluid, so there is more room to work  Some minimally invasive surgeries are done with a machine called a surgical robot. The doctor moves the robot's arms using special controls. Using the robot allows the doctor to be even more exact with their movements. Surgery done with a robot is called \"robot-assisted minimally invasive surgery\" or \"robotic surgery.\"  How is minimally invasive surgery different than open surgery? -- It is usually easier to recover from minimally invasive surgery than open surgery. That's because:  ? There are usually a few small incisions, rather than 1 big incision.  ? The organs don't get moved around as much.  But people who have minimally invasive surgery can still experience pain, often need " stitches, and have risks, just like with open surgery.  Can I always choose to have minimally invasive surgery? -- No. The type of surgery you can have will depend on:  ? Whether there is a doctor who can do the type of minimally invasive surgery that you need  ? Why you need surgery - For example, people who need surgery to remove very large cancers might need to have open surgery.  ? What other health problems you have - For example, people who have serious heart or lung problems might not be able to have minimally invasive surgery.  What else do I need to know? -- Sometimes, doctors need to switch to open surgery after a minimally invasive surgery has been started. For example, this might happen if the doctor finds something unexpected. This doesn't mean that the doctor has done anything wrong. It is usually done to keep you safe during your surgery.  All topics are updated as new evidence becomes available and our peer review process is complete.  This topic retrieved from [employer] on: May 30, 2024.  Topic 26226 Version 13.0  Release: 32.5.3 - C32.150  © 2024 UpToDate, Inc. and/or its affiliates. All rights reserved.  figure 1: Minimally invasive surgery

## 2024-06-06 NOTE — ANESTHESIA PROCEDURE NOTES
Airway  Date/Time: 6/6/2024 11:52 AM  Urgency: elective    Airway not difficult    Staffing  Performed: CRNA   Authorized by: Gabe Berumen MD    Performed by: SHAYNE Carlos  Patient location during procedure: OR    Indications and Patient Condition  Indications for airway management: anesthesia and airway protection  Spontaneous Ventilation: absent  Sedation level: deep  Preoxygenated: yes  Patient position: sniffing  Mask difficulty assessment: 1 - vent by mask  Planned trial extubation    Final Airway Details  Final airway type: endotracheal airway      Successful airway: ETT  Cuffed: yes   Successful intubation technique: direct laryngoscopy  Facilitating devices/methods: intubating stylet  Blade: Chelsie  Blade size: #4  ETT size (mm): 7.0  Cormack-Lehane Classification: grade I - full view of glottis  Placement verified by: chest auscultation, capnometry and palpation of cuff   Measured from: lips  ETT to lips (cm): 21  Number of attempts at approach: 1    Additional Comments  Lips and teeth in preinduction condition

## 2024-06-06 NOTE — ANESTHESIA POSTPROCEDURE EVALUATION
Patient: Jodi Romero    Procedure Summary       Date: 06/06/24 Room / Location: [facility] / [facility]    Anesthesia Start: 1128 Anesthesia Stop: 1242    Procedure: EXPLORATION LAPAROSCOPY Diagnosis:       Pain in female pelvis      Dysmenorrhea      Dyspareunia, female      (Pain in female pelvis [R10.2])      (Dysmenorrhea [N94.6])      (Dyspareunia, female [N94.10])    Surgeons: Alin Liang MD Responsible Provider: Gabe Berumen MD    Anesthesia Type: general ASA Status: 2            Anesthesia Type: general    Vitals Value Taken Time   /74 06/06/24 1242   Temp 36.36 06/06/24 1242   Pulse 70 06/06/24 1237   Resp 16 06/06/24 1242   SpO2 96 % 06/06/24 1237   Vitals shown include unfiled device data.    Anesthesia Post Evaluation    Patient location during evaluation: PACU  Patient participation: complete - patient participated  Level of consciousness: awake and alert  Pain score: 0  Pain management: adequate  Multimodal analgesia pain management approach  Airway patency: patent  Two or more strategies used to mitigate risk of obstructive sleep apnea  Cardiovascular status: acceptable and stable  Respiratory status: acceptable and face mask  Hydration status: acceptable  Postoperative Nausea and Vomiting: none  Comments: Pt denoies pain or N/V.        There were no known notable events for this encounter.

## 2024-07-23 ENCOUNTER — APPOINTMENT (OUTPATIENT)
Dept: OBSTETRICS AND GYNECOLOGY | Facility: CLINIC | Age: 39
End: 2024-07-23
Payer: COMMERCIAL

## 2024-07-23 VITALS
DIASTOLIC BLOOD PRESSURE: 80 MMHG | HEIGHT: 60 IN | SYSTOLIC BLOOD PRESSURE: 122 MMHG | BODY MASS INDEX: 37.69 KG/M2 | WEIGHT: 192 LBS

## 2024-07-23 DIAGNOSIS — N80.9 ENDOMETRIOSIS: ICD-10-CM

## 2024-07-23 DIAGNOSIS — N94.10 DYSPAREUNIA, FEMALE: ICD-10-CM

## 2024-07-23 DIAGNOSIS — R10.2 PAIN IN FEMALE PELVIS: Primary | ICD-10-CM

## 2024-07-23 DIAGNOSIS — N94.6 DYSMENORRHEA: ICD-10-CM

## 2024-07-23 DIAGNOSIS — N80.03 ADENOMYOSIS: ICD-10-CM

## 2024-07-23 DIAGNOSIS — N92.4 EXCESSIVE BLEEDING IN PREMENOPAUSAL PERIOD: ICD-10-CM

## 2024-07-23 PROCEDURE — 99213 OFFICE O/P EST LOW 20 MIN: CPT | Performed by: OBSTETRICS & GYNECOLOGY

## 2024-07-23 PROCEDURE — 3008F BODY MASS INDEX DOCD: CPT | Performed by: OBSTETRICS & GYNECOLOGY

## 2024-07-23 RX ORDER — RELUGOLIX, ESTRADIOL HEMIHYDRATE, AND NORETHINDRONE ACETATE 40; 1; .5 MG/1; MG/1; MG/1
1 TABLET, FILM COATED ORAL DAILY
Qty: 28 TABLET | Refills: 2 | Status: SHIPPED | OUTPATIENT
Start: 2024-07-23 | End: 2024-08-22

## 2024-07-23 NOTE — PROGRESS NOTES
Subjective   Patient ID: Jodi Romero is a 39 y.o. female who presents for Post-op.  Established patient 39 years old.  2 C-sections.  Bilateral salpingectomy.  Years of increasing pelvic pain, pain with intercourse and heavy menses.  We did do a laparoscopy findings were consistent with adenomyosis    We discussed adenomyosis that is an estrogen dependent condition.  We talked about options including GnRH antagonist medication.  She does not smoke.  No history of heart attack stroke blood clots or breast cancer.  Samples and prescription called in for Myfembree    She is tried anti-inflammatories and birth control pills.  Neither of them gave her significant relief from her pain.  She failed first-line agents so recommend Myfembree.  Reviewed medication and side effects.  Samples and prescription.  Information pamphlet.  Follow-up 1 month    Review of symptoms of adenomyosis and endometriosis.  Very similar in terms of painful menses, heavy menses, dyspareunia.  Reviewed the role of estrogen.  She has no contraindications to GnRH antagonist.  Samples and prescription for Myfembree.  Follow-up 1 month.  Start on the first day of her next menses         Review of Systems   Genitourinary:  Positive for dyspareunia, menstrual problem and pelvic pain.       Objective   Physical Exam    Assessment/Plan   Follow-up after laparoscopy.  Review of symptoms consistent with adenomyosis, endometriosis.  Trial of Myfembree.  Reviewed medications side effects.  Samples and prescription.  Information pamphlet.  Follow-up 6 weeks.  Start first day of her next menses         Alin Liang MD 07/23/24 11:34 AM

## 2024-08-13 ENCOUNTER — PHARMACY VISIT (OUTPATIENT)
Dept: PHARMACY | Facility: CLINIC | Age: 39
End: 2024-08-13
Payer: COMMERCIAL

## 2024-08-13 PROCEDURE — RXMED WILLOW AMBULATORY MEDICATION CHARGE

## 2024-09-17 ENCOUNTER — APPOINTMENT (OUTPATIENT)
Dept: OBSTETRICS AND GYNECOLOGY | Facility: CLINIC | Age: 39
End: 2024-09-17
Payer: COMMERCIAL

## 2024-09-17 VITALS
SYSTOLIC BLOOD PRESSURE: 118 MMHG | BODY MASS INDEX: 38.28 KG/M2 | HEIGHT: 60 IN | WEIGHT: 195 LBS | DIASTOLIC BLOOD PRESSURE: 76 MMHG

## 2024-09-17 DIAGNOSIS — R10.2 PAIN IN FEMALE PELVIS: ICD-10-CM

## 2024-09-17 DIAGNOSIS — N80.9 ENDOMETRIOSIS: Primary | ICD-10-CM

## 2024-09-17 PROCEDURE — 3008F BODY MASS INDEX DOCD: CPT | Performed by: OBSTETRICS & GYNECOLOGY

## 2024-09-17 PROCEDURE — 99213 OFFICE O/P EST LOW 20 MIN: CPT | Performed by: OBSTETRICS & GYNECOLOGY

## 2024-09-17 NOTE — PROGRESS NOTES
Subjective   Patient ID: Jodi Romero is a 39 y.o. female who presents for Follow-up.  , Review of my previous note where we started Myfembree  Follow-up after laparoscopy.  Review of symptoms consistent with adenomyosis, endometriosis.  Trial of Myfembree.  Reviewed medications side effects.  Samples and prescription.  Information pamphlet.  Follow-up 6 weeks.  Start first day of her next menses    Since starting the medication she initially had daily headaches and they become less frequent.  She is also aware of some hair loss.  Overall no pain or bleeding.  She has not been sexually active.  At this point comfortable staying on the medication but I would like to see her again in 3 months.  Continue Myfembree with a history of adenomyosis endometriosis pelvic pain abnormal bleeding.  Actually she did have sex and there was no pain issues        Review of Systems   All other systems reviewed and are negative.      Objective   Physical Exam  Constitutional:       Appearance: Normal appearance. She is obese.   Neurological:      Mental Status: She is alert.         Assessment/Plan   Overall pain is 100% better.  She did miss 1 day and noticed more pain.  Overall happy with the medication but having some minimal side effects headaches are improving discussed the low percentage of hair loss that was shown in studies.  Continue Myfembree.  Follow-up 3 months         Alin Liang MD 09/17/24 11:18 AM

## 2024-11-15 PROCEDURE — RXMED WILLOW AMBULATORY MEDICATION CHARGE

## 2024-11-18 ENCOUNTER — PHARMACY VISIT (OUTPATIENT)
Dept: PHARMACY | Facility: CLINIC | Age: 39
End: 2024-11-18
Payer: COMMERCIAL

## 2024-12-11 ENCOUNTER — APPOINTMENT (OUTPATIENT)
Dept: ORTHOPEDIC SURGERY | Age: 39
End: 2024-12-11
Payer: COMMERCIAL

## 2024-12-11 ENCOUNTER — APPOINTMENT (OUTPATIENT)
Dept: RADIOLOGY | Facility: HOSPITAL | Age: 39
End: 2024-12-11
Payer: COMMERCIAL

## 2024-12-11 ENCOUNTER — HOSPITAL ENCOUNTER (EMERGENCY)
Facility: HOSPITAL | Age: 39
Discharge: HOME | End: 2024-12-11
Payer: COMMERCIAL

## 2024-12-11 VITALS
SYSTOLIC BLOOD PRESSURE: 119 MMHG | HEIGHT: 60 IN | HEART RATE: 98 BPM | DIASTOLIC BLOOD PRESSURE: 83 MMHG | OXYGEN SATURATION: 96 % | RESPIRATION RATE: 16 BRPM | BODY MASS INDEX: 37.3 KG/M2 | TEMPERATURE: 97.9 F | WEIGHT: 190 LBS

## 2024-12-11 DIAGNOSIS — S06.0X0A CONCUSSION WITHOUT LOSS OF CONSCIOUSNESS, INITIAL ENCOUNTER: ICD-10-CM

## 2024-12-11 DIAGNOSIS — M79.18 MUSCULOSKELETAL PAIN: ICD-10-CM
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
## 2024-12-11 PROCEDURE — 72125 CT NECK SPINE W/O DYE: CPT

## 2024-12-11 PROCEDURE — 2500000004 HC RX 250 GENERAL PHARMACY W/ HCPCS (ALT 636 FOR OP/ED): Performed by: NURSE PRACTITIONER

## 2024-12-11 PROCEDURE — 70450 CT HEAD/BRAIN W/O DYE: CPT

## 2024-12-11 PROCEDURE — 70450 CT HEAD/BRAIN W/O DYE: CPT | Performed by: RADIOLOGY

## 2024-12-11 PROCEDURE — 99284 EMERGENCY DEPT VISIT MOD MDM: CPT | Mod: 25

## 2024-12-11 PROCEDURE — 96372 THER/PROPH/DIAG INJ SC/IM: CPT | Performed by: NURSE PRACTITIONER

## 2024-12-11 PROCEDURE — 72125 CT NECK SPINE W/O DYE: CPT | Performed by: RADIOLOGY

## 2024-12-11 PROCEDURE — 2500000005 HC RX 250 GENERAL PHARMACY W/O HCPCS: Performed by: NURSE PRACTITIONER

## 2024-12-11 PROCEDURE — 2500000001 HC RX 250 WO HCPCS SELF ADMINISTERED DRUGS (ALT 637 FOR MEDICARE OP): Performed by: NURSE PRACTITIONER

## 2024-12-11 RX ORDER — ACETAMINOPHEN 325 MG/1
975 TABLET ORAL ONCE
Status: COMPLETED | OUTPATIENT
Start: 2024-12-11 | End: 2024-12-11

## 2024-12-11 RX ORDER — ONDANSETRON 4 MG/1
4 TABLET, ORALLY DISINTEGRATING ORAL EVERY 8 HOURS PRN
Qty: 20 TABLET | Refills: 0 | Status: SHIPPED | OUTPATIENT
Start: 2024-12-11 | End: 2024-12-18

## 2024-12-11 RX ORDER — KETOROLAC TROMETHAMINE 15 MG/ML
15 INJECTION, SOLUTION INTRAMUSCULAR; INTRAVENOUS ONCE
Status: COMPLETED | OUTPATIENT
Start: 2024-12-11 | End: 2024-12-11

## 2024-12-11 RX ORDER — ONDANSETRON 4 MG/1
4 TABLET, ORALLY DISINTEGRATING ORAL ONCE
Status: COMPLETED | OUTPATIENT
Start: 2024-12-11 | End: 2024-12-11

## 2024-12-11 RX ORDER — MECLIZINE HYDROCHLORIDE 25 MG/1
25 TABLET ORAL 3 TIMES DAILY PRN
Qty: 30 TABLET | Refills: 0 | Status: SHIPPED | OUTPATIENT
Start: 2024-12-11 | End: 2024-12-21

## 2024-12-11 ASSESSMENT — PAIN DESCRIPTION - LOCATION
LOCATION: HEAD
LOCATION: BACK

## 2024-12-11 ASSESSMENT — LIFESTYLE VARIABLES
EVER FELT BAD OR GUILTY ABOUT YOUR DRINKING: NO
EVER HAD A DRINK FIRST THING IN THE MORNING TO STEADY YOUR NERVES TO GET RID OF A HANGOVER: NO
HAVE PEOPLE ANNOYED YOU BY CRITICIZING YOUR DRINKING: NO
TOTAL SCORE: 0
HAVE YOU EVER FELT YOU SHOULD CUT DOWN ON YOUR DRINKING: NO

## 2024-12-11 ASSESSMENT — COLUMBIA-SUICIDE SEVERITY RATING SCALE - C-SSRS
6. HAVE YOU EVER DONE ANYTHING, STARTED TO DO ANYTHING, OR PREPARED TO DO ANYTHING TO END YOUR LIFE?: NO
2. HAVE YOU ACTUALLY HAD ANY THOUGHTS OF KILLING YOURSELF?: NO
1. IN THE PAST MONTH, HAVE YOU WISHED YOU WERE DEAD OR WISHED YOU COULD GO TO SLEEP AND NOT WAKE UP?: NO

## 2024-12-11 ASSESSMENT — PAIN DESCRIPTION - DESCRIPTORS: DESCRIPTORS: HEADACHE

## 2024-12-11 ASSESSMENT — PAIN SCALES - PAIN ASSESSMENT IN ADVANCED DEMENTIA (PAINAD): TOTALSCORE: MEDICATION (SEE MAR)

## 2024-12-11 ASSESSMENT — PAIN SCALES - GENERAL: PAINLEVEL_OUTOF10: 6

## 2024-12-11 ASSESSMENT — PAIN - FUNCTIONAL ASSESSMENT: PAIN_FUNCTIONAL_ASSESSMENT: 0-10

## 2024-12-11 ASSESSMENT — PAIN DESCRIPTION - PAIN TYPE: TYPE: ACUTE PAIN

## 2024-12-11 NOTE — ED TRIAGE NOTES
Pt states that she was skiing Saturday and fell backwards hitting her head on snow covered dirt, pt states that she is having a HA and nausea

## 2024-12-11 NOTE — Clinical Note
Jodi Romero was seen and treated in our emergency department on 12/11/2024.  She may return to work on 12/13/2024.       If you have any questions or concerns, please don't hesitate to call.      Nu Burciaga, APRN-CNP
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
## 2024-12-16 ENCOUNTER — OFFICE VISIT (OUTPATIENT)
Dept: ORTHOPEDIC SURGERY | Facility: HOSPITAL | Age: 39
End: 2024-12-16
Payer: COMMERCIAL

## 2024-12-16 ENCOUNTER — HOSPITAL ENCOUNTER (OUTPATIENT)
Dept: RADIOLOGY | Facility: HOSPITAL | Age: 39
Discharge: HOME | End: 2024-12-16
Payer: COMMERCIAL

## 2024-12-16 VITALS — WEIGHT: 190 LBS | HEIGHT: 60 IN | BODY MASS INDEX: 37.3 KG/M2

## 2024-12-16 DIAGNOSIS — M54.6 THORACIC SPINE PAIN: ICD-10-CM

## 2024-12-16 DIAGNOSIS — M89.8X1 PAIN OF RIGHT SCAPULA: Primary | ICD-10-CM

## 2024-12-16 PROCEDURE — 3008F BODY MASS INDEX DOCD: CPT | Performed by: FAMILY MEDICINE

## 2024-12-16 PROCEDURE — 1036F TOBACCO NON-USER: CPT | Performed by: FAMILY MEDICINE

## 2024-12-16 PROCEDURE — 72070 X-RAY EXAM THORAC SPINE 2VWS: CPT | Performed by: RADIOLOGY

## 2024-12-16 PROCEDURE — 99203 OFFICE O/P NEW LOW 30 MIN: CPT | Performed by: FAMILY MEDICINE

## 2024-12-16 PROCEDURE — 99213 OFFICE O/P EST LOW 20 MIN: CPT | Performed by: FAMILY MEDICINE

## 2024-12-16 PROCEDURE — 72070 X-RAY EXAM THORAC SPINE 2VWS: CPT

## 2024-12-16 ASSESSMENT — PAIN - FUNCTIONAL ASSESSMENT: PAIN_FUNCTIONAL_ASSESSMENT: 0-10

## 2024-12-16 ASSESSMENT — PAIN SCALES - GENERAL: PAINLEVEL_OUTOF10: 8

## 2024-12-16 NOTE — PROGRESS NOTES
Orthopedic Injury Clinic Note  Date: 12/16/2024    Assessment & Plan:    Dx: Thoracic back pain; R scapular pain    Discussed options for management of this condition and made shared decision making for the selected treatment listed below:  - Today's treatment plan: CT thoracic spine to rule-out occult fracture.  - Work/exercise limits: If no fracture present, no limitations to activity.  Advised on back stretching.     All questions answered and patient is agreeable to plan of care.    Chief complaint: Thoracic back pain    HPI:  40 yo F,[facility] vascular [facility], seen in injury clinic for thoracic back pain after a fall on 12/7 when she slipped while skiing and fell onto packed snow directly onto her back.  Went to the ER in 12/11 and had CT brain/C-spine performed for headache and neck pain at the time.  Since then she reports she is having some continued midline thoracic back pain.  She has some tingling over her right shoulder but no radicular symptoms, weakness or numbness going down her arms.      Patient Active Problem List   Diagnosis    Arthropathy    Cyst of joint of left hand    External hemorrhoids    Migraine headache    Thoracic spondylosis without myelopathy    BMI 38.0-38.9,adult    Low vitamin D level    Mixed hyperlipidemia    Pain in female pelvis    Dysmenorrhea    Dyspareunia, female     Past Surgical History:   Procedure Laterality Date    OTHER SURGICAL HISTORY  06/15/2015    Prior Surgical Procedure Not Done     Current Outpatient Medications on File Prior to Visit   Medication Sig Dispense Refill    meclizine (Antivert) 25 mg tablet Take 1 tablet (25 mg) by mouth 3 times a day as needed for dizziness for up to 10 days. 30 tablet 0    ondansetron ODT (Zofran-ODT) 4 mg disintegrating tablet Dissolve 1 tablet (4 mg) in the mouth every 8 hours if needed for nausea or vomiting for up to 7 days. 20 tablet 0    relugolix-estradiol-norethindr (Myfembree) 40-1-0.5 mg tablet Take 1 tablet by mouth  once daily. 28 tablet 2     No current facility-administered medications on file prior to visit.       Exam:  General Exam:  Constitutional - NAD, AAO x 3, conversing appropriately.  HEENT- Normocephalic and atraumatic. No facial deformities. Hearing grossly normal.  Lungs - Breathing non-labored with normal rate. No accessory muscle use.  CV - Extremities warm and well-perfused, brisk capillary refill present.   Neuro - CN II-XII grossly intact.    Right Shoulder Exam:  No swelling, warmth or ecchymosis of shoulder present.   AROM: Flexion [150] [id number]; Abduction [130] [id number]; full PROM  Tender across spine of R scapula  NTTP over clavicle, AC joint, subacromial space, posterior GHJ line, deltoid, trapezius  [5]/5 strength in ER, IR, abduction, flexion   Sensation intact in all fingers and symmetrical    Spine Exam:  Normal gait  No gross spinal deformity observed.  No back hematomas  No cervical or lumbar midline ttp. No step-offs.  Point tenderness over midline T3  NTTP along paraspinals  [5]/5 strength of hip flexion (L2/L3), knee extension (L4), ankle DF (L4), EHL (L5), ankle PF (S1)  SILT in all dermatomal distributions L3-S1       Results:  Xrays of thoracic spine obtained on 12/6 reviewed and independently interpreted as:   No obvious fractures or osseous abnormalities    ** Please excuse any errors in grammar or translation related to this dictation. Voice recognition software was utilized to prepare this document. **    Ganesh Gaston MD  Sports Medicine Fellow

## 2024-12-17 ENCOUNTER — PHARMACY VISIT (OUTPATIENT)
Dept: PHARMACY | Facility: CLINIC | Age: 39
End: 2024-12-17
Payer: COMMERCIAL

## 2024-12-17 ENCOUNTER — APPOINTMENT (OUTPATIENT)
Dept: OBSTETRICS AND GYNECOLOGY | Facility: CLINIC | Age: 39
End: 2024-12-17
Payer: COMMERCIAL

## 2024-12-17 ENCOUNTER — HOSPITAL ENCOUNTER (OUTPATIENT)
Dept: RADIOLOGY | Facility: HOSPITAL | Age: 39
Discharge: HOME | End: 2024-12-17
Payer: COMMERCIAL

## 2024-12-17 VITALS
HEIGHT: 60 IN | DIASTOLIC BLOOD PRESSURE: 70 MMHG | WEIGHT: 201 LBS | BODY MASS INDEX: 39.46 KG/M2 | SYSTOLIC BLOOD PRESSURE: 112 MMHG

## 2024-12-17 DIAGNOSIS — N80.03 ADENOMYOSIS: ICD-10-CM

## 2024-12-17 DIAGNOSIS — M54.6 THORACIC SPINE PAIN: ICD-10-CM

## 2024-12-17 DIAGNOSIS — M89.8X1 PAIN OF RIGHT SCAPULA: ICD-10-CM

## 2024-12-17 DIAGNOSIS — N80.9 ENDOMETRIOSIS: Primary | ICD-10-CM

## 2024-12-17 DIAGNOSIS — R10.2 PAIN IN FEMALE PELVIS: ICD-10-CM

## 2024-12-17 PROCEDURE — RXMED WILLOW AMBULATORY MEDICATION CHARGE

## 2024-12-17 PROCEDURE — 72128 CT CHEST SPINE W/O DYE: CPT | Performed by: RADIOLOGY

## 2024-12-17 PROCEDURE — 3008F BODY MASS INDEX DOCD: CPT | Performed by: OBSTETRICS & GYNECOLOGY

## 2024-12-17 PROCEDURE — 99213 OFFICE O/P EST LOW 20 MIN: CPT | Performed by: OBSTETRICS & GYNECOLOGY

## 2024-12-17 PROCEDURE — 72128 CT CHEST SPINE W/O DYE: CPT

## 2024-12-17 RX ORDER — RELUGOLIX, ESTRADIOL HEMIHYDRATE, AND NORETHINDRONE ACETATE 40; 1; .5 MG/1; MG/1; MG/1
1 TABLET, FILM COATED ORAL DAILY
Qty: 30 TABLET | Refills: 5 | Status: SHIPPED | OUTPATIENT
Start: 2024-12-17 | End: 2025-01-16

## 2024-12-17 NOTE — PROGRESS NOTES
Subjective   Patient ID: Jodi Romero is a 39 y.o. female who presents for Follow-up.  Established patient 39 years old.  2 C-sections.  Tubal ligation.  Had surgery last year showing possible adenomyosis and symptoms of endometriosis.  We started her on Myfembree in July 2023 she did a follow-up in September.  Since her September follow-up she has had 1 cycle not as painful and otherwise not having any pain at all.  Overall her symptoms are much better.    He is having some minimal side effects.  When she misses pills she has headaches so discussed the importance of not missing pills.  She has had some weight gain but we did discuss that this is not a known side effect of the Myfembree it may also be the holiday season.  She also was having issues and more hair loss that has slowed in rate    At this time comfortable staying on Myfembree.  Did review that the FDA approved it for 2 years so per the FDA should be good until July 2026.  Did discuss that around the world it is approved without a limit like 2 years can continue indefinitely based on bone loss data    Impression is adenomyosis endometriosis symptoms improved on Myfembree refill Myfembree.  Scheduled to see me in May for annual exam        Review of Systems   All other systems reviewed and are negative.      Objective   Physical Exam  Constitutional:       Appearance: Normal appearance. She is obese.   Neurological:      Mental Status: She is alert.         Assessment/Plan   Refill Myfembree.  Overall symptoms of adenomyosis or endometriosis are much better.  See me in May for annual exam.  Started Myfembree in July 2024.  FDA approved till July 2026         Alin Liang MD 12/17/24 10:49 AM

## 2025-02-27 PROCEDURE — RXMED WILLOW AMBULATORY MEDICATION CHARGE

## 2025-02-28 ENCOUNTER — PHARMACY VISIT (OUTPATIENT)
Dept: PHARMACY | Facility: CLINIC | Age: 40
End: 2025-02-28
Payer: COMMERCIAL

## 2025-05-06 ENCOUNTER — APPOINTMENT (OUTPATIENT)
Dept: OBSTETRICS AND GYNECOLOGY | Facility: CLINIC | Age: 40
End: 2025-05-06
Payer: COMMERCIAL

## 2025-05-06 VITALS
HEIGHT: 60 IN | SYSTOLIC BLOOD PRESSURE: 124 MMHG | BODY MASS INDEX: 39.07 KG/M2 | DIASTOLIC BLOOD PRESSURE: 78 MMHG | WEIGHT: 199 LBS

## 2025-05-06 DIAGNOSIS — R35.0 URINARY FREQUENCY: ICD-10-CM

## 2025-05-06 DIAGNOSIS — Z12.31 SCREENING MAMMOGRAM, ENCOUNTER FOR: ICD-10-CM

## 2025-05-06 DIAGNOSIS — Z01.419 WELL WOMAN EXAM: Primary | ICD-10-CM

## 2025-05-06 LAB
POC BLOOD, URINE: NEGATIVE
POC GLUCOSE, URINE: NEGATIVE MG/DL
POC LEUKOCYTES, URINE: NEGATIVE
POC NITRITE,URINE: NEGATIVE
POC PROTEIN, URINE: ABNORMAL MG/DL

## 2025-05-06 PROCEDURE — 99396 PREV VISIT EST AGE 40-64: CPT | Performed by: OBSTETRICS & GYNECOLOGY

## 2025-05-06 PROCEDURE — 3008F BODY MASS INDEX DOCD: CPT | Performed by: OBSTETRICS & GYNECOLOGY

## 2025-05-06 PROCEDURE — 81002 URINALYSIS NONAUTO W/O SCOPE: CPT | Performed by: OBSTETRICS & GYNECOLOGY

## 2025-05-06 NOTE — PROGRESS NOTES
Subjective   Patient ID: Jodi Romero is a 40 y.o. female who presents for well woman visit.  Here for annual exam.  40 years old.  Has stopped her Myfembree.  Not having significant pain.  Cycles are monthly.  Non-smoker.  Family history negative.  Last Pap was 2021 next Pap 2026.    On exam today breast abdominal pelvic exams were all normal.  Mammogram ordered.  Well woman exam.  Follow-up 1 year.    Review of my note from last year at which time we did do a laparoscopy consistent with adenomyosis.  A trial of Myfembree was started but discontinued secondary to headaches but she is doing okay in the interim       Alin Liang MD  Physician  Obstetrics     Progress Notes     Signed     Encounter Date: 5/7/2024    Signed     Expand All Collapse All    Subjective  Patient ID: Jodi Romero is a 39 y.o. female who presents for Follow-up.  Review of my last note,     Alin Liang MD  Physician  Obstetrics     Progress Notes     Signed     Encounter Date: 4/2/2024     Signed      Expand All Collapse All        Subjective   Patient ID: Jodi Romero is a 38 y.o. female who presents for Well woman visit and Pelvic Pain.  Review of last year's note,     Alin Liang MD  Physician  Specialty: Obstetrics and Gynecology     Progress Notes     Signed     Encounter Date: 8/27/2021     Signed             Patient Discussion/Summary     Established patient 36 years old. 2 C-sections. Tubal ligation. Comes in for annual exam. She is aware of a lesion on the right labia majora at the 10 o'clock position     No new worries or concerns. Breast and pelvic exams were normal Pap smear obtained     We do see a small area of erythema. She had some recent drainage. She has had these symptoms in the past where it appears to be spontaneously draining vulvar abscess     There is a small area of ulceration which could just be the leading point of drainage but we want to rule out HSV so an HSV culture was done     Impressions well woman exam 2  children. Bilateral salpingectomy at time of last , culture to rule out HSV               Presents today for annual exam but having worsening symptoms of pain and cramps premenstrually and then after sex having some bleeding and pain with sex     2 C-sections.  Tubal ligation.  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  No meds.  Non-smoker no prior surgeries other than to  tubal ligation.  Children are 3 and 4 years old.  Last Pap  was negative negative     Family history thinks her mother had fibroids     Exam today breast abdominal pelvic exams normal.  Speculum exam I did not see any abnormal lesions.  Talked with possible causes such as endometriosis, adenomyosis, fibroids.     Well woman exam.  Increasing pelvic pain and dyspareunia.  Obtain pelvic ultrasound.  Follow-up to review results and recommendations.  Next Pap would be      Pelvic Pain  The patient's primary symptoms include pelvic pain.         Review of Systems   Genitourinary:  Positive for dyspareunia and pelvic pain.               Objective   Physical Exam  Constitutional:       Appearance: Normal appearance. She is obese.   Chest:   Breasts:     Right: Normal.      Left: Normal.   Genitourinary:     General: Normal vulva.      Vagina: Normal.      Cervix: Normal.      Uterus: Normal.       Adnexa: Right adnexa normal and left adnexa normal.   Neurological:      Mental Status: She is alert.                  Assessment/Plan   Well woman exam.  Increasing premenstrual pelvic pain.  Dyspareunia.  Bleeding after sex.  Obtain pelvic ultrasound and follow-up to review results and recommendations .  Has tried OTC meds for her pain        Alin Liang MD 24 11:25 AM        Review of pelvic ultrasound,  US PELVIS TRANSABDOMINAL WITH TRANSVAGINAL  Status: Final result     Study Result     Narrative & Impression  Interpreted By:  Juanjose Lindo,   STUDY:  US PELVIS TRANSABDOMINAL WITH  TRANSVAGINAL;  4/16/2024 11:16 am      INDICATION:  Signs/Symptoms:Dysmenorrhea dyspareunia, pain in female pelvis.      COMPARISON:  01/28/2011      ACCESSION NUMBER(S):  [id number]      ORDERING CLINICIAN:  POLO BRAY      TECHNIQUE:  Multiple multiplanar static gray scale, color and spectral waveform  sonographic images of the pelvis were obtained. Transabdominal and  endovaginal ultrasound was performed.      FINDINGS:  UTERUS:  The uterus measures  4.2 cm  x 3.1 cm x 8.1 cm. The uterine  myometrium appears normal.      ENDOMETRIUM:  The endometrium measures a thickness of 0.3 cm, which is normal.      RIGHT ADNEXA:  Not visualized due to overlying bowel gas.      LEFT ADNEXA:  The left ovary measures 2.0 cm x 1.1 cm  x 3.1 cm and demonstrates  normal flow. Physiologic follicles are noted. No gross left adnexal  masses are seen, no hydrosalpinx.      CUL DE SAC:  No gross free fluid is seen in the pelvic cul-de-sac.      IMPRESSION:  1. Visualized right adnexa due to overlying bowel gas. Otherwise,  unremarkable pelvic ultrasound.      MACRO:  None      Signed by: Juanjose Lindo 4/17/2024 4:05 PM  Dictation workstation:   [id number]     Review options after normal ultrasound.  We talked about observation versus GnRH antagonist medications that lower estrogen levels versus trying to figure out what is going on with outpatient laparoscopy.  Few years of pain more right-sided.  No medical allergies.  She wants to proceed with laparoscopy.  Reviewed surgery risk benefits complications recovery.  She did not react well to lidocaine in the past.  Avoid lidocaine during laparoscopy                 Review of Systems   Genitourinary:  Positive for dyspareunia and pelvic pain.         Objective  Physical Exam  Constitutional:       Appearance: Normal appearance. She is obese.   Neurological:      Mental Status: She is alert.            Assessment/Plan  Established patient 39 years old.  2 prior C-sections.  Tubal  ligation.  Few years of pain.  Normal ultrasound.  Minimal relief with over-the-counter meds.  Looking for answers.  Plan is laparoscopy.  Reviewed surgery risk benefits complications recovery.  Avoid lidocaine        Alin Liang MD 05/07/24 11:40 AM                   Review of Systems   All other systems reviewed and are negative.      Objective   Physical Exam  Constitutional:       Appearance: Normal appearance.   Chest:   Breasts:     Right: Normal.      Left: Normal.   Genitourinary:     General: Normal vulva.      Vagina: Normal.      Cervix: Normal.      Uterus: Normal.       Adnexa: Right adnexa normal and left adnexa normal.   Neurological:      Mental Status: She is alert.         Assessment/Plan   Well woman exam.  Mammogram ordered.  Follow-up 1 year.  Pap smear in 2026.  Return sooner if she has symptoms of pain or issues with her menses.  Has discontinued Myfembree         Alin Liang MD 05/06/25 10:33 AM

## 2025-05-08 ENCOUNTER — PATIENT MESSAGE (OUTPATIENT)
Dept: CARE COORDINATION | Facility: CLINIC | Age: 40
End: 2025-05-08
Payer: COMMERCIAL

## 2026-05-12 ENCOUNTER — APPOINTMENT (OUTPATIENT)
Dept: OBSTETRICS AND GYNECOLOGY | Facility: CLINIC | Age: 41
End: 2026-05-12
Payer: COMMERCIAL

## (undated) DEVICE — TUBE SET, PNEUMOCLEAR, SMOKE EVACU, HIGH-FLOW

## (undated) DEVICE — NEEDLE, INSUFFLATION, 13GAX120MM, DISP

## (undated) DEVICE — SUTURE, VICRYL, 4-0, 18 IN, PS2, UNDYED

## (undated) DEVICE — TISSUE ADHESIVE, PREMIERPRO EXOFIN, PRECISION PEN HV, 1.0ML

## (undated) DEVICE — Device

## (undated) DEVICE — CARE KIT, LAPAROSCOPIC, ADVANCED